# Patient Record
Sex: FEMALE | Race: WHITE | NOT HISPANIC OR LATINO | URBAN - METROPOLITAN AREA
[De-identification: names, ages, dates, MRNs, and addresses within clinical notes are randomized per-mention and may not be internally consistent; named-entity substitution may affect disease eponyms.]

---

## 2025-04-21 ENCOUNTER — OFFICE VISIT (OUTPATIENT)
Dept: URGENT CARE | Age: 60
End: 2025-04-21

## 2025-04-21 VITALS
BODY MASS INDEX: 21.63 KG/M2 | SYSTOLIC BLOOD PRESSURE: 129 MMHG | HEART RATE: 66 BPM | WEIGHT: 130 LBS | DIASTOLIC BLOOD PRESSURE: 83 MMHG | OXYGEN SATURATION: 98 % | TEMPERATURE: 97.1 F | RESPIRATION RATE: 16 BRPM

## 2025-04-21 DIAGNOSIS — S50.362A INSECT BITE OF LEFT ELBOW, INITIAL ENCOUNTER: Primary | ICD-10-CM
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
## 2025-04-21 PROCEDURE — 99070 SPECIAL SUPPLIES PHYS/QHP: CPT

## 2025-04-21 PROCEDURE — 99203 OFFICE O/P NEW LOW 30 MIN: CPT

## 2025-04-21 RX ORDER — METHYLPREDNISOLONE 4 MG/1
TABLET ORAL
Qty: 21 TABLET | Refills: 0 | Status: SHIPPED | OUTPATIENT
Start: 2025-04-21 | End: 2025-04-28

## 2025-04-21 RX ORDER — DOXYCYCLINE 100 MG/1
100 CAPSULE ORAL 2 TIMES DAILY
Qty: 20 CAPSULE | Refills: 0 | Status: SHIPPED | OUTPATIENT
Start: 2025-04-21 | End: 2025-05-01

## 2025-04-21 ASSESSMENT — ENCOUNTER SYMPTOMS: COLOR CHANGE: 1

## 2025-04-21 NOTE — PROGRESS NOTES
Subjective   Patient ID: Annetta Acosta is a 59 y.o. female. They present today with a chief complaint of Insect Bite (For 1 week with redness and itchiness ).    History of Present Illness  HPI    Past Medical History  Allergies as of 04/21/2025    (No Known Allergies)       Prescriptions Prior to Admission[1]     Medical History[2]    Surgical History[3]         Review of Systems  Review of Systems   Skin:  Positive for color change.   All other systems reviewed and are negative.                                 Objective    Vitals:    04/21/25 0914   BP: 129/83   BP Location: Left arm   Patient Position: Sitting   BP Cuff Size: Adult   Pulse: 66   Resp: 16   Temp: 36.2 °C (97.1 °F)   TempSrc: Temporal   SpO2: 98%   Weight: 59 kg (130 lb)     No LMP recorded.    Physical Exam  Vitals reviewed.   Constitutional:       Appearance: Normal appearance.   HENT:      Head: Normocephalic and atraumatic.   Cardiovascular:      Rate and Rhythm: Normal rate and regular rhythm.      Pulses: Normal pulses.      Heart sounds: Normal heart sounds.   Pulmonary:      Effort: Pulmonary effort is normal.      Breath sounds: Normal breath sounds.   Musculoskeletal:         General: Tenderness present. Normal range of motion.      Cervical back: Normal range of motion.   Skin:     Findings: Erythema present.   Neurological:      General: No focal deficit present.      Mental Status: She is alert and oriented to person, place, and time.   Psychiatric:         Mood and Affect: Mood normal.         Behavior: Behavior normal.         Procedures    Point of Care Test & Imaging Results from this visit  No results found for this visit on 04/21/25.   Imaging  No results found.    Cardiology, Vascular, and Other Imaging  No other imaging results found for the past 2 days      Diagnostic study results (if any) were reviewed by CAROL Norris.    Assessment/Plan   Allergies, medications, history, and pertinent  labs/EKGs/Imaging reviewed by CAROL Norris.     Medical Decision Making  59-year-old female presents for insect bite possible tick bite happened over a week ago she reports she was in the woods.  On exam patient is in no acute distress vital signs are stable heart rate is regular lungs are clear bilaterally there is erythema surrounding elbow left elbow with a small area of possible insect bite no drainage.  There is some redness under her left arm pit.  Do not feel any lymph nodes swollen.  Patient denies fever or chills.  Due to possible insect bite and erythema we will treat with doxycycline as directed patient can take Zyrtec or Claritin daily for itching start Medrol Dosepak as directed also.  Patient is to follow-up with primary care doctor in 1 to 2 days Go to the emergency department for any worsening symptoms any increased redness any fever or chills.  Patient refusing tetanus shot.  Patient left in stable condition.    Orders and Diagnoses  There are no diagnoses linked to this encounter.    Medical Admin Record      Patient disposition: Home    Electronically signed by CAROL Norris  9:41 AM           [1] (Not in a hospital admission)  [2]   Past Medical History:  Diagnosis Date    Personal history of other endocrine, nutritional and metabolic disease     History of hypothyroidism   [3] No past surgical history on file.

## 2025-04-21 NOTE — PATIENT INSTRUCTIONS
Start doxycycline antibiotic.  Start Medrol Dosepak.  Take Zyrtec or Claritin daily for itching.  Follow-up with primary care doctor in 1 to 2 days for recheck.  If any worsening redness any fever chills worsening symptoms go immediately to the emergency department.

## 2025-07-23 ENCOUNTER — PATIENT OUTREACH (OUTPATIENT)
Dept: CARE COORDINATION | Facility: CLINIC | Age: 60
End: 2025-07-23

## 2025-07-23 DIAGNOSIS — Z12.31 ENCOUNTER FOR SCREENING MAMMOGRAM FOR BREAST CANCER: ICD-10-CM
